# Patient Record
Sex: MALE | Employment: OTHER | URBAN - METROPOLITAN AREA
[De-identification: names, ages, dates, MRNs, and addresses within clinical notes are randomized per-mention and may not be internally consistent; named-entity substitution may affect disease eponyms.]

---

## 2017-04-05 ENCOUNTER — TRANSFERRED RECORDS (OUTPATIENT)
Dept: HEALTH INFORMATION MANAGEMENT | Facility: CLINIC | Age: 27
End: 2017-04-05

## 2017-04-27 ENCOUNTER — HOSPITAL ENCOUNTER (OUTPATIENT)
Dept: BEHAVIORAL HEALTH | Facility: CLINIC | Age: 27
Discharge: HOME | End: 2017-04-27
Attending: SOCIAL WORKER | Admitting: SOCIAL WORKER
Payer: COMMERCIAL

## 2017-04-27 VITALS
HEIGHT: 74 IN | HEART RATE: 84 BPM | WEIGHT: 206.4 LBS | DIASTOLIC BLOOD PRESSURE: 84 MMHG | SYSTOLIC BLOOD PRESSURE: 137 MMHG | BODY MASS INDEX: 26.49 KG/M2

## 2017-04-27 PROCEDURE — H0001 ALCOHOL AND/OR DRUG ASSESS: HCPCS

## 2017-04-27 ASSESSMENT — ANXIETY QUESTIONNAIRES
GAD7 TOTAL SCORE: 0
1. FEELING NERVOUS, ANXIOUS, OR ON EDGE: NOT AT ALL
4. TROUBLE RELAXING: NOT AT ALL
5. BEING SO RESTLESS THAT IT IS HARD TO SIT STILL: NOT AT ALL
7. FEELING AFRAID AS IF SOMETHING AWFUL MIGHT HAPPEN: NOT AT ALL
2. NOT BEING ABLE TO STOP OR CONTROL WORRYING: NOT AT ALL
6. BECOMING EASILY ANNOYED OR IRRITABLE: NOT AT ALL
3. WORRYING TOO MUCH ABOUT DIFFERENT THINGS: NOT AT ALL

## 2017-04-27 NOTE — PROGRESS NOTES
Rule 25 Assessment  Background Information   1. Date of Assessment Request  2. Date of Assessment  4/27/17 3. Date Service Authorized     4.   Jason Callanan, [facility] Intern   5.  Phone Number   [phone] 6. Referent  Self 7. Assessment Site  [facility]     8. Client Name   Kris Amaya 9. Date of Birth  1990 Age  26 year old 10. Gender  male  11. PMI/ Insurance No.  [id number]   12. Client's Primary Language:  English 13. Do you require special accommodations, such as an  or assistance with written material? No   14. Current Address: [address] [place] [id number]   15. Client Phone Numbers: [phone] (home)      16. Tell me what has happened to bring you here today.    The patient came in for a CD assessment at [address] [place] [address]. The patient stated that his reason for being here was for opiate addiction. He was receiving CD treatment at [facility] and was discharged 4/26/17 from [facility] for fraternization with female patients. He was there for 30 days for inpatient/residential and 25 days in residential day treatment. He is looking for a lesser level of care and is going with his continuum of care recommendations for extended care from his previous treatment team.       17. Have you had other rule 25 assessments?     No    DIMENSION I - Acute Intoxication /Withdrawal Potential   1. Chemical use most recent 12 months outside a facility and other significant use history (client self-report)              X = Primary Drug Used   Age of First Use Most Recent Pattern of Use and Duration   Need enough information to show pattern (both frequency and amounts) and to show tolerance for each chemical that has a diagnosis   Date of last use and time, if needed   Withdrawal Potential? Requiring special care Method of use  (oral, smoked, snort, IV, etc)      Alcohol     17 At 17: First use, got alcohol poisoning   On weekends in teens: weekend binge  "drinking, 10-12 beers regularly,   At 18: went to [employer] for vacation and binged. Reported drinking between 6 to 12 beers per night during this time.  In College: Would drink 3 to 4 days a week. Had underage drinking tickets. Could drink a liter of fireball whiskey on weekends.  1 Liter of alcohol per drinking episode   Recent: drinking less due to opiate addiction. 2 or 3 times a month. 2-10 beers during drinking episodes.    Longest period of sobriety: 5 months since December, 2016. December 2016 no oral      Marijuana/  Hashish   17 Was selling THC in high school and using THC everyday. Tolerance increase. From 2-4 bowls to 1/16th oz to 1/8th oz. At 24, was smoking 1/4th oz daily   Heaviest Use: 19 to 23.    Use in Past Year: smoking 2 grams daily    Longest Period of Sobriety: 56 days on 4/27/17   3/5/17 no Smoke  oral      Cocaine/Crack     22 Would use when \"someone had it.\"  Recreational use and came with partying and drinking. Would use a gram per day during partying.    Later on around age 26, was mixing with heroin (speedballing) and recently using once or twice a week prior to entering treatment at [facility]. Recent frequency of use, using a gram per week or per every other week.  3/4/17 no Snorted  IV      Meth/  Amphetamines   22 \"Goofball\" - meth and heroin mixture was done once (February)    (22 to 24) Adderall, used it to study and to go out partying. Not prescribed. Between 25 to 40 mg per day when using.   Most recent use was between 25 to 40 mg every couple of days until February, 2017.  Longest period of sobriety: 2 1/2 months since February, 2017. February, 2017 no Snorted  Oral  IV     X Heroin     22 First use: snorted once time when 22 in college.  Second time: 26, went home to [place] for thanksgiving. Took home oxy's with him to take while home. Ended up taking to oxy's but was not getting the same high. Called up friend who had heroin and used IV.     Heaviest Use: 1/2 to 1 gram in a day " avg.    Use in Past Year: see above    Longest Period of Sobriety: None    3/4/17 no IV  snort     X Other Opiates/  Synthetics   20 At age [age], started with using 30 mg per day which escalated quickly to 120 to 150 mg per day (20 to 22). He reports having binge episodes, 270 to 300 mgs on occasion at least once per week or per every other week.  22 to 24, was on suboxone. At 24, relapsed. Using 30 mgs every other day transitioned to every day. Topped out at 150 mgs. Continues to have binge episodes up to 300 mgs  Heaviest Use:  In college, ages 20 to 22 where daily use of oxycontin was between 120 mg to 150 mg per day.    Use in Past Year: Use had increased after he had relapsed at a party at age 24. Use in last year was between 120 to 150 mg.     Longest Period of Sobriety: 2 years from 22 to 24 while on suboxone.     3/5/17 no Oral  Snort  IV      Inhalants     N/A           Benzodiazepines     21 From 21 to 22, would use (1 to 2 mgs) Xanax to potentiate opiate high. Prolazepram use. 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     Summer 2016 no Oral  snort      Hallucinogens     18  Acid and mushrooms. 21 to 22 would drop acid once a month.  MDMA, used 5 times total June 2016 no Oral       Barbiturates/  Sedatives/  Hypnotics N/A           Over-the-Counter Drugs   N/A           Other     N/A           Nicotine     17 Smoked cigarettes recreationally in high school and would use chewing tobacco.  Heaviest Use: would use a tin chewing tobacco once a day at age 24.       Use in Past Year: Recently, a tin would last about a week.    Longest Period of Sobriety: None   4/26/17  Smoke  oral     2. Do you use greater amounts of alcohol/other drugs to feel intoxicated or achieve the desired effect?  Yes.  Or use the same amount and get less of an effect?  Yes.  Example: Increased tolerance of opiates and THC has caused this patient to need to use more than he intended to become  intoxicated.    3A. Have you ever been to detox?     No    3B. When was the first time?     NA    3C. How many times since then?     NA    3D. Date of most recent detox:     NA    4.  Withdrawal symptoms: Have you had any of the following withdrawal symptoms?  Past 12 months Recent (past 30 days)   Sweating (Rapid Pulse)  Unable to Sleep  Agitation  Fatigue / Extremely Tired  Sad / Depressed Feeling  Muscle Aches  Vivid / Unpleasant Dreams  Irritability  High Blood Pressure  Nausea / Vomiting  Diarrhea  Diminished Appetite  Anxiety / Worried None     's Visual Observations and Symptoms: No visible withdrawal symptoms at this time    Based on the above information, is withdrawal likely to require attention as part of treatment participation?  No    Dimension I Ratings   Acute intoxication/Withdrawal potential - The placing authority must use the criteria in Dimension I to determine a client s acute intoxication and withdrawal potential.    RISK DESCRIPTIONS - Severity ratin Client displays full functioning with good ability to tolerate and cope with withdrawal discomfort. No signs or symptoms of intoxication or withdrawal or resolving signs or symptoms.    REASONS SEVERITY WAS ASSIGNED (What about the amount of the person s use and date of most recent use and history of withdrawal problems suggests the potential of withdrawal symptoms requiring professional assistance? )     This patient reports that he has been sober since 3/5/17 when he was admitted to [facility] for 30 days of treatment. This patient reported that he transitioned from Inpatient to day treatment extended care which he remained sober for 25 days until he was discharged. Patient's GONZALO was 0.000 and UA was negative for all tested substances. He reports sobriety at the time of his evaluation. At the time of the assessment, he endorsed no withdrawal symptoms.         DIMENSION II - Biomedical Complications and Conditions   1. Do you  have any current health/medical conditions?(Include any infectious diseases, allergies, or chronic or acute pain, history of chronic conditions)       No    2. Do you have a health care provider? When was your most recent appointment? What concerns were identified?     Last saw PCP at age 23. Did know about drug use and did suggest treatment at the time.    3. If indicated by answers to items 1 or 2: How do you deal with these concerns? Is that working for you? If you are not receiving care for this problem, why not?      NA    4A. List current medication(s) including over-the-counter or herbal supplements--including pain management:     NA    4B. Do you follow current medical recommendations/take medications as prescribed?     NA    4C. When did you last take your medication?     NA    5. Has a health care provider/healer ever recommended that you reduce or quit alcohol/drug use?     Yes, his PCP recommended CD treatment 3 years ago and that was the last time he saw his PCP.     6. Are you pregnant?     No    7. Have you had any injuries, assaults/violence towards you, accidents, health related issues, overdose(s) or hospitalizations related to your use of alcohol or other drugs:     Yes, explain: had blackouts. One overdose several years ago,had narcan used on him.    8. Do you have any specific physical needs/accommodations? No    Dimension II Ratings   Biomedical Conditions and Complications - The placing authority must use the criteria in Dimension II to determine a client s biomedical conditions and complications.   RISK DESCRIPTIONS - Severity ratin Client displays full functioning with good ability to cope with physical discomfort.    REASONS SEVERITY WAS ASSIGNED (What physical/medical problems does this person have that would inhibit his or her ability to participate in treatment? What issues does he or she have that require assistance to address?)    This patient reports no current medical conditions  or complications that would inhibit him from participating in treatment. This patient reports not currently prescribed or taking any over-the-counter medications.         DIMENSION III - Emotional, Behavioral, Cognitive Conditions and Complications   1. (Optional) Tell me what it was like growing up in your family. (substance use, mental health, discipline, abuse, support)     Family Environment: Patient was raised by both parents. He has 2 siblings. He stated that family environment was positive. Patient stated that life growing up was really good, parents both worked and lived a comfortable life. Was hardworking, paid for everything myself. Was working at age 13. Life started to turn in high school when he started getting into trouble. Felt that mom and dad wouldn't trust him in high school which caused a lot of problems  Substance Use: Patient reported substance use in family members. He stated that materal Grandmother and Grandfather and paternal grandfather had have had problems with abusing substances. He reported maternal grandmother and granfather are currently sober. He reports that no other family members have had CD treatment.  Mental Health: Patient denied a formal mental health diagnosis.  Abuse: Patient denied history of physical, emotional, and/or sexual abuse.  Support: Patient reported feeling supported most of the time. He stated that he was supported by his family.      2. When was the last time that you had significant problems...  A. with feeling very trapped, lonely, sad, blue, depressed or hopeless  about the future? 2 - 12 months ago, he reported that this was related to when he was heavily using in his addiction and realized he was getting out of control    B. with sleep trouble, such as bad dreams, sleeping restlessly, or falling  asleep during the day? 2 - 12 months ago    C. with feeling very anxious, nervous, tense, scared, panicked, or like  something bad was going to happen? 2 - 12  months ago    D. with becoming very distressed and upset when something reminded  you of the past? 2 - 12 months ago    E. with thinking about ending your life or committing suicide? 2 - 12 months ago, was having SI when using and withdrawing recently on a vacation.    3. When was the last time that you did the following things two or more times?  A. Lied or conned to get things you wanted or to avoid having to do  something? 2 - 12 months ago    B. Had a hard time paying attention at school, work, or home? 2 - 12 months ago     C. Had a hard time listening to instructions at school, work, or home? 2 - 12 months ago    D. Were a bully or threatened other people? 2 - 12 months ago    E. Started physical fights with other people? 2 - 12 months ago    Note: These questions are from the Global Appraisal of Individual Needs--Short Screener. Any item marked  past month  or  2 to 12 months ago  will be scored with a severity rating of at least 2.     For each item that has occurred in the past month or past year ask follow up questions to determine how often the person has felt this way or has the behavior occurred? How recently? How has it affected their daily living? And, whether they were using or in withdrawal at the time?    See above    4A. If the person has answered item 2E with  in the past year  or  the past month , ask about frequency and history of suicide in the family or someone close and whether they were under the influence.     He reported that he had thoughts of suicide 2 to 4 months ago when he was heavily using and realizing he had lost control of his addiction. He reports since being sober that he has not had any SI and reports his mood as better.    Any history of suicide in your family? Or someone close to you?     No    4B. If the person answered item 2E  in the past month  ask about  intent, plan, means and access and any other follow-up information  to determine imminent risk. Document any actions  taken to intervene  on any identified imminent risk.      N/A    5A. Have you ever been diagnosed with a mental health problem?     No    5B. Are you receiving care for any mental health issues? If yes, what is the focus of that care or treatment?  Are you satisfied with the service? Most recent appointment?  How has it been helpful?     NA     6. Have you been prescribed medications for emotional/psychological problems?     NA    7. Does your MH provider know about your use?     NA    8A. Have you ever been verbally, emotionally, physically or sexually abused?      No     Follow up questions to learn current risk, continuing emotional impact.      NA    8B. Have you received counseling for abuse?      N/A    9. Have you ever experienced or been part of a group that experienced community violence, historical trauma, rape or assault?     No    10A. [place]:    No    11. Do you have problems with any of the following things in your daily life?    No    Note: If the person has any of the above problems, follow up with items 12, 13, and 14. If none of the issues in item 11 are a problem for the person, skip to item 15.      12. Have you been diagnosed with traumatic brain injury or Alzheimer s?  No    13. If the answer to #12 is no, ask the following questions:    Have you ever hit your head or been hit on the head? No    Were you ever seen in the Emergency Room, hospital or by a doctor because of an injury to your head? No    Have you had any significant illness that affected your brain (brain tumor, meningitis, West Nile Virus, stroke or seizure, heart attack, near drowning or near suffocation)? No    14. If the answer to #12 is yes, ask if any of the problems identified in #11 occurred since the head injury or loss of oxygen. NA    15A. Highest grade of school completed:     Some college, but no degree    15B. Do you have a learning disability? No    15C. Did you ever have tutoring in Math or English? No    15D. Have  you ever been diagnosed with Fetal Alcohol Effects or Fetal Alcohol Syndrome? No    16. If yes to item 15 B, C, or D: How has this affected your use or been affected by your use?     NA    Dimension III Ratings   Emotional/Behavioral/Cognitive - The placing authority must use the criteria in Dimension III to determine a client s emotional, behavioral, and cognitive conditions and complications.   RISK DESCRIPTIONS - Severity ratin Client has difficulty with impulse control and lacks coping skills. Client has thoughts of suicide or harm to others without means; however, the thoughts may interfere with participation in some treatment activities. Client has difficulty functioning in significant life areas. Client has moderate symptoms of emotional, behavioral, or cognitive problems. Client is able to participate in most treatment activities.    REASONS SEVERITY WAS ASSIGNED - What current issues might with thinking, feelings or behavior pose barriers to participation in a treatment program? What coping skills or other assets does the person have to offset those issues? Are these problems that can be initially accommodated by a treatment provider? If not, what specialized skills or attributes must a provider have?    This patient denies a formal mental health diagnosis and reports no current SI, SA, or HI. This patient completed the PHQ-9 (score was 0) and the ABI 7(score was 0). He reported that he had thoughts of suicide 2 to 4 months ago when he was heavily using and realizing he had lost control of his addiction. He reports since being sober that he has not had any SI and reports his mood as better. He reports lacking impulse controls and has limited insight into how his addiction has affected his mental health outside of feeling mentally and emotionally healthier in sobriety.  He reports lacking impulse control skills as his using history suggests an all day, consistent pattern of use despite patient's attempts  "to cut back or curb use. Patient reported that was unable to feel happy or enjoyment when he wasn't using.         DIMENSION IV - Readiness for Change   1. You ve told me what brought you here today. (first section) What do you think the problem really is?     \"I need help transitioning into the real world. When I got kicked out at [facility], I was thrown off. Looking for some further help with transitioning and having accountability.\" He was kicked out due to fraternization with female patients at [facility] outpatient w/housing program and is following recommendations of his treatment team for continuum of care. He reports that he felt that the program was not good for him in that he was unable to stop fraternizing with female patients and it is what lead him to being discharge. He reports that besides that, he had a good experience and was able to gain a lot of insight into his addiction and has a solid foundation to work with to help him in sobriety.    2. Tell me how things are going. Ask enough questions to determine whether the person has use related problems or assets that can be built upon in the following areas: Family/friends/relationships; Legal; Financial; Emotional; Educational; Recreational/ leisure; Vocational/employment; Living arrangements (DSM)      This patient reports that his family is very supportive of him being in recovery and want to see him succeed and live a sober life. They were proud of him for making the initial push of wanting to attend residential treatment and received support from his family members financially to get him into treatment. He reports that his roommates are also supportive of his recovery and he has provided them with resources and Narcan in the case that he relapses.He reports that he currently living with 2 roommates in [place] but is looking for sober housing or an apartment of his own. He reports that only one roommate goes out and drinks at bars and the " "other roommate does not use non-prescribed mood altering chemicals. He reports that he currently has no legal problems.  He reports his current financial situation as \"going okay\" his family is helping financially support him at this time and that he is currently looking for employment. He reports that he is almost done with his college degree at [employer]. He reports that since being sober he has had been participating in more recreational activities with his roommates. He stated during his heaviest using period that he would consistently isolate himself from others and and would not answer calls from family members or friends.    3. What activities have you engaged in when using alcohol/other drugs that could be hazardous to you or others (i.e. 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    4. How much time do you spend getting, using or getting over using alcohol or drugs? (DSM)     Would spend all day getting and using drugs.     5. Reasons for drinking/drug use (Use the space below to record answers. It may not be necessary to ask each item.)  Like the feeling Yes   Trying to forget problems Yes   To cope with stress Yes   To relieve physical pain No   To cope with anxiety Yes   To cope with depression Yes   To relax or unwind Yes   Makes it easier to talk with people Yes   Partner encourages use Yes   Most friends drink or use No   To cope with family problems No   Afraid of withdrawal symptoms/to feel better Yes   Other (specify)  N/A     A. What concerns other people about your alcohol or drug use/Has anyone told you that you use too much? What did they say? (DSM)     \"They knew I had been in treatment before and I was getting out of control and was stealing from others. They were worried about my safety and health. Worried if I were to end up in " "assisted or dead.\"    B. What did you think about that/ do you think you have a problem with alcohol or drug use?     \"It was hard for me to acknowledge it at the time but I do see how it affected them now.\"    6. What changes are you willing to make? What substance are you willing to stop using? How are you going to do that? Have you tried that before? What interfered with your success with that goal?      Willing to do anything. Willing to stop doing all drugs. Want to be not taking any anticraving drugs like naltrexone.     7. What would be helpful to you in making this change?     Looking to find a home group. Looking for a sponsor. Attending OP treatment.    Dimension IV Ratings   Readiness for Change - The placing authority must use the criteria in Dimension IV to determine a client s readiness for change.   RISK DESCRIPTIONS - Severity ratin Client is motivated with active reinforcement, to explore treatment and strategies for change, but ambivalent about illness or need for change.    REASONS SEVERITY WAS ASSIGNED - (What information did the person provide that supports your assessment of his or her readiness to change? How aware is the person of problems caused by continued use? How willing is she or he to make changes? What does the person feel would be helpful? What has the person been able to do without help?)      This patient reports that he is motivated towards recovery and is actively looking for treatment in the community. Although, this patient was recently discharged from his previous treatment due to fraternization with female patients. This suggests a lack of consistent behaviors towards recovery. He was discharged on 17 and came in for Rule 25 CD assessment on 17 which suggests motivation to continue working on sobriety. This patient reports ambivalence towards changing his behaviors regarding boundaries with other individuals. He reported that he is willing to look for a home " "group, obtain a sponsor, and attend [facility] treatment.         DIMENSION V - Relapse, Continued Use, and Continued Problem Potential   1. In what ways have you tried to control, cut-down or quit your use? If you have had periods of sobriety, how did you accomplish that? What was helpful? What happened to prevent you from continuing your sobriety? (DSM)     Yes, tried to cold turkey, willpower, using suboxone. \"Trying to quit by myself never worked out because I would be in withdrawal and get cravings and would feel miserable all of the time.\"     2. Have you experienced cravings? If yes, ask follow up questions to determine if the person recognizes triggers and if the person has had any success in dealing with them.     Yes, in the past year, certain music were triggers. \"I need help figure out what my triggers are\"     3. Have you been treated for alcohol/other drug abuse/dependence?     Yes.  3B. Number of times(lifetime) (over what period) 2.  3C. Number of times completed treatment (lifetime) 1.  3D. During the past three years have you participated in outpatient and/or residential?  Yes.  3E. When and where? In March, 2017 at [facility].   3F. What was helpful? What was not? Was able to get support and learn coping skills and gained insight into his addiction.    4. Support group participation: Have you/do you attend support group meetings to reduce/stop your alcohol/drug use? How recently? What was your experience? Are you willing to restart? If the person has not participated, is he or she willing?     When in [facility], had gone to 6 meetings per week.     5. What would assist you in staying sober/straight?     \"My support system, having more treatment.\" working with his spirituality.    Dimension V Ratings   Relapse/Continued Use/Continued problem potential - The placing authority must use the criteria in Dimension V to determine a client s relapse, continued use, and continued problem potential.   RISK " "DESCRIPTIONS - Severity ratin (A) Client has minimal recognition and understanding of relapse and recidivism issues and displays moderate vulnerability for further substance use or mental health problems. (B) Client has some coping skills inconsistently applied.    REASONS SEVERITY WAS ASSIGNED - (What information did the person provide that indicates his or her understanding of relapse issues? What about the person s experience indicates how prone he or she is to relapse? What coping skills does the person have that decrease relapse potential?)      This patient has a limited understanding of his relapse process and lacks insight into his relapse triggers, cues, and warning signs. He reports that he recognized certain songs as triggers but has not been able to recognize any other triggers thus far. He reports having developed some coping skills such as attending 12-step support group meetings and is looking to obtain a sponsor and begin working the program as well having family support and being connected with his spirituality.          DIMENSION VI - Recovery Environment   1. Are you employed/attending school? Tell me about that.     Unemployed, enrolled at  of  but not currently taking classes due to addiction and then being in treatment.    2A. Describe a typical day; evening for you. Work, school, social, leisure, volunteer, spiritual practices. Include time spent obtaining, using, recovering from drugs or alcohol. (DSM)     Using day: \"sleep until 2 pm, call dealers, get drugs, take a shower, get high, play video games or go to class\"    Sober day: \"eat breakfast, workout, lunch, go to class/work, go to meeting, eat dinner.\"    2B. How often do you spend more time than you planned using or use more than you planned? (DSM)     \"I'm only going to use some in the morning but this would never last\" always use more than planned on a everyday basis.    3. How important is using to your social connections? Do " "many of your family or friends use?     Not important    4A. Are you currently in a significant relationship?     Yes.  4B. How long? 3 to 4 weeks    4C. Sexual Orientation:     Heterosexual    5A. Who do you live with?      With roommates    5B. Tell me about their alcohol/drug use and mental health issues.     One is sober, the other goes out to bars but doesn't smoke THC.     5C. Are you concerned for your safety there? Yes    5D. Are you concerned about the safety of anyone else who lives with you? No    6A. Do you have children who live with you?     NA    6B. Do you have children who do not live with you?     NA    7A. Who supports you in making changes in your alcohol or drug use? What are they willing to do to support you? Who is upset or angry about you making changes in your alcohol or drug use? How big a problem is this for you?      \"Family, roommates, girlfriend. Have set boundaries with roommates and provided them with the Narcan crash course so that they know what to do if I were to relapse. Family are able to provide emotional support.\"     7B. This table is provided to record information about the person s relationships and available support It is not necessary to ask each item; only to get a comprehensive picture of their support system.  How often can you count on the following people when you need someone?   Partner / Spouse N/A   Parent(s)/Aunt(s)/Uncle(s)/Grandparents Always supportive   Sibling(s)/Cousin(s) Always supportive   Child(alessia) N/A   Other relative(s) Usually supportive   Friend(s)/neighbor(s) Always supportive   Child(alessia) s father(s)/mother(s) N/A   Support group member(s) Always supportive   Community of talat members N/A   /counselor/therapist/healer N/A   Other (specify) No     8A. What is your current living situation?     Lives with roommates in a house in [place], [place].    8B. What is your long term plan for where you will be living?     \"Looking to " "get my own place but unsure because I dont like living alone. Looking for sober roommates.\"    8C. Tell me about your living environment/neighborhood? Ask enough follow up questions to determine safety, criminal activity, availability of alcohol and drugs, supportive or antagonistic to the person making changes.      \"I feel safe, no criminal activities, one roommate drinks infrequently, has a supportive environment\"    9. Criminal justice history: Gather current/recent history and any significant history related to substance use--Arrests? Convictions? Circumstances? Alcohol or drug involvement? Sentences? Still on probation or parole? Expectations of the court? Current court order? Any sex offenses - lifetime? What level? (DSM)    None    10. What obstacles exist to participating in treatment? (Time off work, childcare, funding, transportation, pending snf time, living situation)     None    Dimension VI Ratings   Recovery environment - The placing authority must use the criteria in Dimension VI to determine a client s recovery environment.   RISK DESCRIPTIONS - Severity ratin Client has passive social  or family and significant other are not interested in the client s recovery. The client is engaged in structured meaningful activity.    REASONS SEVERITY WAS ASSIGNED - (What support does the person have for making changes? What structure/stability does the person have in his or her daily life that will increase the likelihood that changes can be sustained? What problems exist in the person s environment that will jeopardize getting/staying clean and sober?)     This patient reports having significant support from family members and friends for his recovery. He reports currently looking for OP CD treatment program to help him with holding him accountable in his early phases of recovery. He is currently unemployed and not enrolled in classes at [facility]. He reports that his living environment is safe " and that his roommates and girlfriend are supportive of his sobriety. He has been consistently attending 12-step support group meetings while in previous treatment is looking to continue attending meetings while living in the community.          Client Choice/Exceptions   Would you like services specific to language, age, gender, culture, Adventist preference, race, ethnicity, sexual orientation or disability?  No    What particular treatment choices and options would you like to have? Looking for Evening Oupatient prmary CD    Do you have a preference for a particular treatment program? [facility]    Criteria for Diagnosis     Criteria for Diagnosis  DSM-5 Criteria for Substance Use Disorder  Instructions: Determine whether the client currently meets the criteria for Substance Use Disorder using the diagnostic criteria in the DSM-V pp.[phone]. 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    If court related records were reviewed, summarize here: NA    Information from collateral contacts supported/largely agreed with information from the client and associated risk ratings.      Rule 25 Assessment Summary and Plan   's Recommendation    1) Enter an  outpatient treatment program for primary CD treatment.  2) Abstain from alcohol and all other non-prescribed mood altering substances.  3) Follow all recommendations of medical and mental health providers.   4) Follow all the recommendations of primary counselors.  5) Attend 12-step or other support group meetings on a weekly basis.  6) Obtain a sponsor and begin working a recovery program.      Collateral Contacts     Name:    Electronic Medical Records   Relationship:    N/A   Phone Number:    N/A Releases:    Yes     Collateral data from Electronic Medical Records confirms patient chemical use history.      Collateral Contacts     Name:    Fernando Amaya   Relationship:    Father   Phone Number:    [phone]   Releases:    Yes     At the completion of this assessment, collateral information was not provided.    ollateral Contacts      A problematic pattern of alcohol/drug use leading to clinically significant impairment or distress, as manifested by at least two of the following, occurring within a 12-month period:    Alcohol/drug is often taken in larger amounts or over a longer period than was intended.  There is a persistent desire or unsuccessful efforts to cut down or control alcohol/drug use  A great deal of time is spent in activities necessary to obtain alcohol, use alcohol, or recover from its effects.  Craving, or a strong desire or urge to use alcohol/drug  Recurrent alcohol/drug use resulting in a failure to fulfill major role obligations at work, school or home.  Continued alcohol use despite having persistent or recurrent social or interpersonal problems caused or exacerbated by the effects of alcohol/drug.  Important social, occupational, or recreational activities are given up or reduced because of alcohol/drug use.  Recurrent alcohol/drug use in situations in which it is physically hazardous.  Tolerance, as defined by either of the following: A need for markedly increased amounts of  alcohol/drug to achieve intoxication or desired effect. and A markedly diminished effect with continued use of the same amount of alcohol/drug.  Withdrawal, as manifested by either of the following: The characteristic withdrawal syndrome for alcohol/drug (refer to Criteria A and B of the criteria set for alcohol/drug withdrawal).      Specify if: In early remission:  After full criteria for alcohol/drug use disorder were previously met, none of the criteria for alcohol/drug use disorder have been met for at least 3 months but for less than 12 months (with the exception that Criterion A4,  Craving or a strong desire or urge to use alcohol/drug  may be met).     In sustained remission:   After full criteria for alcohol use disorder were previously met, non of the criteria for alcohol/drug use disorder have been met at any time during a period of 12 months or longer (with the exception that Criterion A4,  Craving or strong desire or urge to use alcohol/drug  may be met).   Specify if:   This additional specifier is used if the individual is in an environment where access to alcohol is restricted.    Mild: Presence of 2-3 symptoms    Moderate: Presence of 4-5 symptoms    Severe: Presence of 6 or more symptoms

## 2017-04-27 NOTE — PROGRESS NOTES
[address] [address]               ADULT CD ASSESSMENT      Additional Clinical Questions - Outpatient    Patient Name: Kris Amaya  Cell Phone:   Home: [phone] (home)    Mobile:   Telephone Information:   Mobile [phone]       Email:  [email or web address]  Emergency Contact: Fernando Fowler   Tel: [phone]    ________________________________________________________________________      The patient is  Single, in a serious relationship    With which race do you identify? White    Please list your family members and if they are living or , i.e. (grandparents, parents, step-parents, adoptive parents, number of siblings, half-siblings, etc.)     Mother   Living Father Living   No Step-mother   NA No Step-father NA   Maternal Grandmother   Living Fraternal Grandmother Living   Maternal Grandfather    Living Fraternal Grandfather Living   No Sister(s) NA 2 Brother(s)   Living   No Half-sister(s)   NA No Half-brother(s) NA             Who raised you? (parents, grandparents, adoptive parents, step-parents, etc.)    Both Parents    Have any of your family members or significant others had problems with mental illness or substance abuse?  Please explain.    Both maternal grandparents had substance use problems but were able to quit on their own. No reported mental illness conditions with his family.    Do you have any children or Stepchildren? No    Are you being investigated by Child Protection Services? No    Do you have a child protection worker, probation office or ? No    How would you describe your current finances?  Some money problems    If you are having problems, (unpaid bills, bankruptcy, IRS problems) please explain:  Yes, please explain: currently unemployed    If working or a student are you able to function appropriately in that setting? Yes    Describe your preferred learning style:  by hands-on practice    What personal strengths do  you have that can help you get sober?  Honesty, loyalty, commitment, willingness    Do you currently self-administer your medications?  N/A    Have you ever:    Had to lie to people important to you about how much you scott?     No     Felt the need to bet more and more money?      No     Attempted treatment for a gambling problem?        No     Touched or fondled someone else inappropriately, or forced them to have sex with you against their will?       No     Are you or have you ever been a registered sex offender?        No     Is there any history of sexual abuse in your family?        No     [place] obsessed by your sexual behavior (having sex with many partners, masturbating often, using pornography often?        Yes, If yes explain: multiple partners     Received therapy or stayed in the hospital for mental health problems?        No     Hurt yourself (cutting, burning or hitting yourself)?        No     Purged, binged or restricted yourself as a way to control your weight?      No       Are you on a special diet?       No       Do you have any concerns regarding your nutritional status?        No       Have you had any appetite changes in the last 3 months?        Yes, If yes explain: it got better when I stopped using       Have you had any weight loss or weight gain in the last 3 months?  If yes, how much gain or loss:     If weight patient gains more than 10 lbs or loses more than 10 lbs, refer to program RN /  Attending Physician for assessment.    Yes, If yes explain: gained 7 pounds        Was the patient informed of BMI?      Normal, No Intervention   Yes     Do you have any dental problems?        No     Lived through any trauma or stressful events?        No     In the past month, have you had any of the following symptoms related to the trauma listed above? (Dreams, intense memories, flashbacks, physical reactions, etc.)         No     Believed that people are spying on you, or that someone was  plotting against you or trying to hurt you?       No     Believed that someone was reading your mind or could hear your thoughts or that you could actually read someone's mind, hear what another person was thinking?       No     Believed that someone or some force outside of yourself put thoughts in your mind that were not your own, or made you act in a way that was not your usual self?  Or have you ever thought you were possessed?         No     Believed that you were being sent special messages through the TV, radio or newspaper?         No     [facility] things other people couldn't hear, such as voices?         No     Had visions when you were awake?  Or have you ever seen things other people couldn't see?       No         Suicide Screening Questions:    1. Are you feeling hopeless about the present/future?   No   2. Have you ever had thoughts about taking your life?   No   3. When did you have these thoughts? NA   4. Do you have any current intent or active desire to take your life?   No   5. Do you have a plan to take your life?    No   6. Have you ever made a suicide attempt?   No   7. Do you have access to pills, guns or other methods to kill yourself?   Yes, If yes explain: firearms       Risk Status - Use as Guide/Example    Ideation - Active  Thoughts of suicide Intent to follow  Through on suicide Plan for completing  suicide    Yes No Yes No Yes No   Emergent X  X  X    Urgent / Non-Emergent X  X   X   Non-Urgent X   X  X   No Current / Active Risk (Past 6 Months)  X  X  X   Kris Amaya No No No       Additional Risk Factors: Tendency to be socially isolated and/or cut off from the support of others  History of impulsive or aggressive behaviors   Protective Factors:  Having people in his/her life that would prevent the patient from considering committing suicide (i.e. young children, spouse, parents, etc.)  Having easy access to supportive family members  Having a good community support network     Risk  "Status:    Emergent? No  Urgent / Non-Emergent?  No  Present / Non- Urgent? No      No Current Risk? Yes, Evaluation Counselors - Document in Epic / SBAR to counselor \"No identified risk\" and Treatment Counselors - Assess weekly in progress notes under Dimension 3 and summarize in Discharge / Treatment summary under Dimension 3.    Additional information to support suicide risk rating: See Above    Mental Status Assessment    Physical Appearance/Attire:  Appears stated age  Hygiene:  well groomed  Eye Contact:  at examiner  Speech:  regular  Speech Volume:  regular  Speech Quality: fluid  Cognitive/Perceptual:  reality based  Cognition:  memory intact , impaired immediate, impaired recent and impaired remote  Judgment:  intact  Insight:  intact  Orientation:  time, place, person and situation  Thought:  logical   Hallucinations:  none  General Behavioral Tone:  cooperative  Psychomotor Activity:  no problem noted  Gait:  no problem  Mood:  normal and appropriate  Affect:  congruence/appropriate    Counselor Notes: NA    Criteria for Diagnosis  DSM-5 Criteria for Substance Abuse    305.00 (F10.10) Alcohol Use Disorder Mild, in early remission  304.30 (F12.20) Cannabis Use Disorder Severe  304.00 (F11.20) Opioid Use Disorder Severe  304.10 (F13.20) Sedative, Hypnotic, Anxiolytic Use Disorder Severe  304.40 (F15.20) Amphetamine Use Disorder Severe  304.20 (F14.20) Cocaine Use Disorder Moderate  305.10 (F17.20) Tobacco Use Disorder Moderate  LEVEL OF CARE    Intoxication and Withdrawal: 0  Biomedical:  0  Emotional and Behavioral:  2  Readiness to Change:  1  Relapse Potential: 3  Recovery Environmental:  1    Initial problem list:    The patient lacks relapse prevention skills  The patient has poor coping skills  The patient has poor refusal skills   The patient has a tendency to isolate    Patient/Client is willing to follow treatment recommendations.  Yes    Jason Callanan      Vulnerable Adult Checklist for " OUTPATIENTS     1.  Do you have a physical, emotional or mental infirmity or dysfunction?       No    2.  Does this issue impair your ability to provide for your own care without help, including providing yourself with food, shelter, clothing, healthcare or supervision?       No    3.  Because of this issue, I need assistance to protect myself from maltreatment by others.      No    Based on the above information:    This person is not a functional Vulnerable Adult according to [place] Statute 626.5572 subdivision 21.

## 2017-04-28 ASSESSMENT — ANXIETY QUESTIONNAIRES: GAD7 TOTAL SCORE: 0

## 2017-04-28 ASSESSMENT — PATIENT HEALTH QUESTIONNAIRE - PHQ9: SUM OF ALL RESPONSES TO PHQ QUESTIONS 1-9: 0

## 2019-06-26 ENCOUNTER — HOSPITAL ENCOUNTER (INPATIENT)
Facility: CLINIC | Age: 29
LOS: 2 days | Discharge: HOME | DRG: 897 | End: 2019-06-28
Attending: EMERGENCY MEDICINE | Admitting: PSYCHIATRY & NEUROLOGY
Payer: COMMERCIAL

## 2019-06-26 DIAGNOSIS — F11.20 UNCOMPLICATED OPIOID DEPENDENCE (H): ICD-10-CM

## 2019-06-26 PROBLEM — F19.20 CHEMICAL DEPENDENCY (H): Status: ACTIVE | Noted: 2019-06-26

## 2019-06-26 LAB
ALBUMIN SERPL-MCNC: 4 G/DL (ref 3.4–5)
ALP SERPL-CCNC: 64 U/L (ref 40–150)
ALT SERPL W P-5'-P-CCNC: 23 U/L (ref 0–70)
AMPHETAMINES UR QL SCN: NEGATIVE
ANION GAP SERPL CALCULATED.3IONS-SCNC: 6 MMOL/L (ref 3–14)
AST SERPL W P-5'-P-CCNC: 24 U/L (ref 0–45)
BARBITURATES UR QL: NEGATIVE
BASOPHILS # BLD AUTO: 0 10E9/L (ref 0–0.2)
BASOPHILS NFR BLD AUTO: 0.2 %
BENZODIAZ UR QL: NEGATIVE
BILIRUB SERPL-MCNC: 0.4 MG/DL (ref 0.2–1.3)
BUN SERPL-MCNC: 16 MG/DL (ref 7–30)
CALCIUM SERPL-MCNC: 9.1 MG/DL (ref 8.5–10.1)
CANNABINOIDS UR QL SCN: POSITIVE
CHLORIDE SERPL-SCNC: 100 MMOL/L (ref 94–109)
CHOLEST SERPL-MCNC: 140 MG/DL
CO2 SERPL-SCNC: 27 MMOL/L (ref 20–32)
COCAINE UR QL: NEGATIVE
CREAT SERPL-MCNC: 0.75 MG/DL (ref 0.66–1.25)
DIFFERENTIAL METHOD BLD: NORMAL
EOSINOPHIL # BLD AUTO: 0.1 10E9/L (ref 0–0.7)
EOSINOPHIL NFR BLD AUTO: 0.5 %
ERYTHROCYTE [DISTWIDTH] IN BLOOD BY AUTOMATED COUNT: 11.5 % (ref 10–15)
ETHANOL UR QL SCN: NEGATIVE
GFR SERPL CREATININE-BSD FRML MDRD: >90 ML/MIN/{1.73_M2}
GLUCOSE SERPL-MCNC: 65 MG/DL (ref 70–99)
HCT VFR BLD AUTO: 44.4 % (ref 40–53)
HDLC SERPL-MCNC: 56 MG/DL
HGB BLD-MCNC: 15 G/DL (ref 13.3–17.7)
IMM GRANULOCYTES # BLD: 0 10E9/L (ref 0–0.4)
IMM GRANULOCYTES NFR BLD: 0.3 %
LDLC SERPL CALC-MCNC: 70 MG/DL
LYMPHOCYTES # BLD AUTO: 2.4 10E9/L (ref 0.8–5.3)
LYMPHOCYTES NFR BLD AUTO: 26 %
MCH RBC QN AUTO: 29.2 PG (ref 26.5–33)
MCHC RBC AUTO-ENTMCNC: 33.8 G/DL (ref 31.5–36.5)
MCV RBC AUTO: 87 FL (ref 78–100)
MONOCYTES # BLD AUTO: 1.1 10E9/L (ref 0–1.3)
MONOCYTES NFR BLD AUTO: 11.9 %
NEUTROPHILS # BLD AUTO: 5.6 10E9/L (ref 1.6–8.3)
NEUTROPHILS NFR BLD AUTO: 61.1 %
NONHDLC SERPL-MCNC: 84 MG/DL
NRBC # BLD AUTO: 0 10*3/UL
NRBC BLD AUTO-RTO: 0 /100
OPIATES UR QL SCN: POSITIVE
PLATELET # BLD AUTO: 212 10E9/L (ref 150–450)
POTASSIUM SERPL-SCNC: 4.3 MMOL/L (ref 3.4–5.3)
PROT SERPL-MCNC: 7.5 G/DL (ref 6.8–8.8)
RBC # BLD AUTO: 5.13 10E12/L (ref 4.4–5.9)
SODIUM SERPL-SCNC: 133 MMOL/L (ref 133–144)
TRIGL SERPL-MCNC: 68 MG/DL
WBC # BLD AUTO: 9.2 10E9/L (ref 4–11)

## 2019-06-26 PROCEDURE — 80307 DRUG TEST PRSMV CHEM ANLYZR: CPT | Performed by: EMERGENCY MEDICINE

## 2019-06-26 PROCEDURE — 99285 EMERGENCY DEPT VISIT HI MDM: CPT | Mod: 25 | Performed by: EMERGENCY MEDICINE

## 2019-06-26 PROCEDURE — 85025 COMPLETE CBC W/AUTO DIFF WBC: CPT | Performed by: FAMILY MEDICINE

## 2019-06-26 PROCEDURE — 80061 LIPID PANEL: CPT | Performed by: FAMILY MEDICINE

## 2019-06-26 PROCEDURE — 80320 DRUG SCREEN QUANTALCOHOLS: CPT | Performed by: EMERGENCY MEDICINE

## 2019-06-26 PROCEDURE — 80053 COMPREHEN METABOLIC PANEL: CPT | Performed by: FAMILY MEDICINE

## 2019-06-26 PROCEDURE — HZ2ZZZZ DETOXIFICATION SERVICES FOR SUBSTANCE ABUSE TREATMENT: ICD-10-PCS | Performed by: PSYCHIATRY & NEUROLOGY

## 2019-06-26 PROCEDURE — 86803 HEPATITIS C AB TEST: CPT | Performed by: FAMILY MEDICINE

## 2019-06-26 PROCEDURE — 25000132 ZZH RX MED GY IP 250 OP 250 PS 637: Performed by: PSYCHIATRY & NEUROLOGY

## 2019-06-26 PROCEDURE — 12800008 ZZH R&B CD ADULT

## 2019-06-26 PROCEDURE — 25000132 ZZH RX MED GY IP 250 OP 250 PS 637: Performed by: EMERGENCY MEDICINE

## 2019-06-26 PROCEDURE — 99284 EMERGENCY DEPT VISIT MOD MDM: CPT | Mod: Z6 | Performed by: EMERGENCY MEDICINE

## 2019-06-26 RX ORDER — BUPRENORPHINE 2 MG/1
2 TABLET SUBLINGUAL 4 TIMES DAILY PRN
Status: DISCONTINUED | OUTPATIENT
Start: 2019-06-26 | End: 2019-06-27

## 2019-06-26 RX ORDER — BISACODYL 10 MG
10 SUPPOSITORY, RECTAL RECTAL DAILY PRN
Status: DISCONTINUED | OUTPATIENT
Start: 2019-06-26 | End: 2019-06-28 | Stop reason: HOSPADM

## 2019-06-26 RX ORDER — TRAZODONE HYDROCHLORIDE 50 MG/1
50 TABLET, FILM COATED ORAL
Status: DISCONTINUED | OUTPATIENT
Start: 2019-06-26 | End: 2019-06-28 | Stop reason: HOSPADM

## 2019-06-26 RX ORDER — ALUMINA, MAGNESIA, AND SIMETHICONE 2400; 2400; 240 MG/30ML; MG/30ML; MG/30ML
30 SUSPENSION ORAL EVERY 4 HOURS PRN
Status: DISCONTINUED | OUTPATIENT
Start: 2019-06-26 | End: 2019-06-28 | Stop reason: HOSPADM

## 2019-06-26 RX ORDER — HYDROXYZINE HYDROCHLORIDE 25 MG/1
25 TABLET, FILM COATED ORAL EVERY 4 HOURS PRN
Status: DISCONTINUED | OUTPATIENT
Start: 2019-06-26 | End: 2019-06-28 | Stop reason: HOSPADM

## 2019-06-26 RX ORDER — CLONIDINE HYDROCHLORIDE 0.1 MG/1
.1-.2 TABLET ORAL EVERY 6 HOURS PRN
Status: DISCONTINUED | OUTPATIENT
Start: 2019-06-26 | End: 2019-06-28 | Stop reason: HOSPADM

## 2019-06-26 RX ORDER — BUPRENORPHINE AND NALOXONE 8; 2 MG/1; MG/1
1 FILM, SOLUBLE BUCCAL; SUBLINGUAL DAILY
Status: ON HOLD | COMMUNITY
End: 2019-06-27

## 2019-06-26 RX ORDER — ACETAMINOPHEN 325 MG/1
650 TABLET ORAL EVERY 4 HOURS PRN
Status: DISCONTINUED | OUTPATIENT
Start: 2019-06-26 | End: 2019-06-28 | Stop reason: HOSPADM

## 2019-06-26 RX ORDER — LOPERAMIDE HCL 2 MG
2 CAPSULE ORAL 4 TIMES DAILY PRN
Status: DISCONTINUED | OUTPATIENT
Start: 2019-06-26 | End: 2019-06-28 | Stop reason: HOSPADM

## 2019-06-26 RX ORDER — IBUPROFEN 600 MG/1
600 TABLET, FILM COATED ORAL EVERY 6 HOURS PRN
Status: DISCONTINUED | OUTPATIENT
Start: 2019-06-26 | End: 2019-06-28 | Stop reason: HOSPADM

## 2019-06-26 RX ORDER — BUPRENORPHINE 2 MG/1
2 TABLET SUBLINGUAL 4 TIMES DAILY PRN
Status: DISCONTINUED | OUTPATIENT
Start: 2019-06-26 | End: 2019-06-26

## 2019-06-26 RX ADMIN — NICOTINE POLACRILEX 8 MG: 4 GUM, CHEWING ORAL at 21:58

## 2019-06-26 RX ADMIN — TRAZODONE HYDROCHLORIDE 50 MG: 50 TABLET ORAL at 22:20

## 2019-06-26 RX ADMIN — NICOTINE POLACRILEX 2 MG: 2 GUM, CHEWING BUCCAL at 19:05

## 2019-06-26 ASSESSMENT — ENCOUNTER SYMPTOMS
HALLUCINATIONS: 0
DYSURIA: 0
COUGH: 0
NAUSEA: 0
VOMITING: 0
FEVER: 0
ABDOMINAL PAIN: 0
SHORTNESS OF BREATH: 0

## 2019-06-26 ASSESSMENT — ACTIVITIES OF DAILY LIVING (ADL)
LAUNDRY: WITH SUPERVISION
DRESS: STREET CLOTHES;INDEPENDENT
HYGIENE/GROOMING: INDEPENDENT
ORAL_HYGIENE: INDEPENDENT

## 2019-06-26 ASSESSMENT — MIFFLIN-ST. JEOR: SCORE: 1951.48

## 2019-06-26 NOTE — ED NOTES
ED to Behavioral Floor Handoff    SITUATION  Kris Amaya is a 29 year old male who speaks English and lives in a home with a spouse The patient arrived in the ED by private car from home with a complaint of Addiction Problem (seeking detox from IV Heroin, has a bed. Last use was this morning. )  .The patient's current symptoms started/worsened 2 week(s) ago and during this time the symptoms have increased.   In the ED, pt was diagnosed with   Final diagnoses:   None        Initial vitals were: BP: 145/78  Pulse: 87  Temp: 98.4  F (36.9  C)  Resp: 16  Weight: 91.2 kg (201 lb)  SpO2: 97 %   --------  Is the patient diabetic? No   If yes, last blood glucose? --     If yes, was this treated in the ED? --  --------  Is the patient inebriated (ETOH) No or Impaired on other substances? No  MSSA done? Yes  Last MSSA score: --    Were withdrawal symptoms treated? N/A  Does the patient have a seizure history? No. If yes, date of most recent seizure--  --------  Is the patient patient experiencing suicidal ideation? denies current or recent suicidal ideation     Homicidal ideation? denies current or recent homicidal ideation or behaviors.    Self-injurious behavior/urges? denies current or recent self injurious behavior or ideation.  ------  Was pt aggressive in the ED No  Was a code called No  Is the pt now cooperative? Yes  -------  Meds given in ED: Medications - No data to display   Family present during ED course? No  Family currently present? No    BACKGROUND  Does the patient have a cognitive impairment or developmental disability? No  Allergies: No Known Allergies.   Social demographics are   Social History     Socioeconomic History     Marital status: Single     Spouse name: None     Number of children: None     Years of education: None     Highest education level: None   Occupational History     None   Social Needs     Financial resource strain: None     Food insecurity:     Worry: None     Inability: None      Transportation needs:     Medical: None     Non-medical: None   Tobacco Use     Smoking status: Never Smoker     Smokeless tobacco: Current User   Substance and Sexual Activity     Alcohol use: Not Currently     Drug use: Yes     Types: IV, Opiates, Marijuana     Sexual activity: Yes     Partners: Female     Birth control/protection: Other   Lifestyle     Physical activity:     Days per week: None     Minutes per session: None     Stress: None   Relationships     Social connections:     Talks on phone: None     Gets together: None     Attends Scientologist service: None     Active member of club or organization: None     Attends meetings of clubs or organizations: None     Relationship status: None     Intimate partner violence:     Fear of current or ex partner: None     Emotionally abused: None     Physically abused: None     Forced sexual activity: None   Other Topics Concern     Parent/sibling w/ CABG, MI or angioplasty before 65F 55M? Not Asked   Social History Narrative     None        ASSESSMENT  Labs results Labs Ordered and Resulted from Time of ED Arrival Up to the Time of Departure from the ED - No data to display   Imaging Studies: No results found for this or any previous visit (from the past 24 hour(s)).   Most recent vital signs /78   Pulse 87   Temp 98.4  F (36.9  C) (Oral)   Resp 16   Wt 91.2 kg (201 lb)   SpO2 97%   BMI 25.81 kg/m     Abnormal labs/tests/findings requiring intervention:---   Pain control: pt had none  Nausea control: pt had none    RECOMMENDATION  Are any infection precautions needed (MRSA, VRE, etc.)? No If yes, what infection? --  ---  Does the patient have mobility issues? independently. If yes, what device does the pt use? ---  ---  Is patient on 72 hour hold or commitment? No If on 72 hour hold, have hold and rights been given to patient? N/A  Are admitting orders written if after 10 p.m. ?N/A  Tasks needing to be completed:---     Oscar Lawson   [facility]--    [phone]  [place] ED   [phone] [facility] ED

## 2019-06-26 NOTE — ED PROVIDER NOTES
[facility] EMERGENCY DEPARTMENT ([facility])     June 26, 2019    History     Chief Complaint   Patient presents with     Addiction Problem     seeking detox from IV Heroin, has a bed. Last use was this morning.      HPI  Kris Amaya is an otherwise healthy 29 year old male who presents to the ED with his girlfriend seeking detox for IV heroine use. Patient reports detox is holding a bed for him. He denies infection or redness, but states he has track marks. He states he has been using for a year and a half. He was in treatment 3 or 4 months ago, then relapsed a couple months ago. Patient has been using about half a gram of heroine a day for the past month, with his last use being this morning. He has a history of withdrawal symptoms which include malaise, subjective fever, chills, restlessness, inability to sleep, and muscle aches. Patient reports he has not had withdrawal symptoms yet. He reports he uses marijuana, but denies alcohol or other drug use. Patient denies fever, nausea, vomiting, abdominal pain, shortness of breath, cough, or dysuria. He also denies suicidal ideation, homicidal ideation, or hallucinations. Of note, patient states he is currently in an intensive outpatient program and they recommended a higher level of care, but he will return to that program after detox.     PAST MEDICAL HISTORY  Past Medical History:   Diagnosis Date     NO ACTIVE PROBLEMS (aka NONE)      PAST SURGICAL HISTORY  Past Surgical History:   Procedure Laterality Date     NO HISTORY OF SURGERY       FAMILY HISTORY  Family History   Problem Relation Age of Onset     Alcohol/Drug Other      Substance Abuse Other      Substance Abuse Maternal Grandmother      Substance Abuse Maternal Grandfather      Substance Abuse Paternal Grandfather      Mental Illness Brother      SOCIAL HISTORY  Social History     Tobacco Use     Smoking status: Never Smoker     Smokeless tobacco: Current User   Substance Use Topics     Alcohol  use: Not Currently     MEDICATIONS  No current facility-administered medications for this encounter.      Current Outpatient Medications   Medication     buprenorphine HCl-naloxone HCl (SUBOXONE) 8-2 MG per film     ALLERGIES  No Known Allergies      I have reviewed the Medications, Allergies, Past Medical and Surgical History, and Social History in the Epic system.    Review of Systems   Constitutional: Negative for fever.   Respiratory: Negative for cough and shortness of breath.    Gastrointestinal: Negative for abdominal pain, nausea and vomiting.   Genitourinary: Negative for dysuria.   Neurological:        Positive for chemical intoxication.   Psychiatric/Behavioral: Negative for hallucinations and suicidal ideas.   All other systems reviewed and are negative.      Physical Exam   BP: 145/78  Pulse: 87  Temp: 98.4  F (36.9  C)  Resp: 16  Weight: 91.2 kg (201 lb)  SpO2: 97 %      Physical Exam     GEN:  Well developed, no acute distress  HEENT:  EOMI, Mucous membranes are moist.   Cardio:  RRR, no murmur, radial pulses equal bilaterally  PULM:  Lungs clear, good air movement, no wheezes, rales   Abd:  Soft, normal bowel sounds, no focal tenderness  Musculoskeletal:  normal range of motion, no lower extremity swelling or calf tenderness  Neuro:  Alert and oriented X3, Follows commands, moving all extremities spontaneously   Skin:  Warm, dry  Psych: Normal mood and affect, denies suicidal ideation, homicidal ideation, hallucinations      ED Course        Procedures       4:46 PM  The patient was seen and examined by Dr. Diallo in Room 16C.          Critical Care time:  none  We will obtain urine tox screen in the ED.  Patient has been cooperative in the ED.    Labs Ordered and Resulted from Time of ED Arrival Up to the Time of Departure from the ED - No data to display         Assessments & Plan (with Medical Decision Making)   Patient reports seeking admission to detox for IV heroin abuse and dependence.  There  are no acute mental health issues or medical emergencies that need attention.  He will be admitted to detox once the unit is ready for him.    I have reviewed the nursing notes.    I have reviewed the findings, diagnosis, plan and need for follow up with the patient.       Medication List      There are no discharge medications for this visit.         Final diagnoses:   Uncomplicated opioid dependence (H)     IEmmanuelle, am serving as a trained medical scribe to document services personally performed by Lanette Diallo MD, based on the provider's statements to me.      Lanette BUCK MD, was physically present and have reviewed and verified the accuracy of this note documented by Emmanuelle Carcamo.     6/26/2019   [facility], [place], EMERGENCY DEPARTMENT     Lanette Diallo MD  06/26/19 [id number]

## 2019-06-26 NOTE — PHARMACY-ADMISSION MEDICATION HISTORY
"Admission Medication History status for the 6/26/2019 admission is complete.  See EPIC admission navigator for Prior to Admission medications.    Medication history sources:  Patient     Medication history source reliability: Good    Medication adherence:  Poor    Changes made to PTA medication list (reason)  Added: n/a  Deleted: n/a  Changed: n/a    Additional medication history information (including reliability of information, actions taken by pharmacist):   -Patient was a good historian; he reports taking Suboxone \"sporadically\" for the past month since he has been using heroin again. States that he uses Suboxone on the days that he does not use heroin and last took 8mg on Saturday (6/22/19)    Time spent in this activity: 10 minutes    Medication history completed by: DOMINIC Cordoba4 Pharmacy Intern     Prior to Admission medications    Medication Sig Last Dose Taking? Auth Provider   buprenorphine HCl-naloxone HCl (SUBOXONE) 8-2 MG per film Place 1 Film under the tongue daily 6/22/2019 at Unknown time Yes Reported, Patient       "

## 2019-06-27 LAB
HCV AB SERPL QL IA: NONREACTIVE
HIV 1+2 AB+HIV1 P24 AG SERPL QL IA: NONREACTIVE

## 2019-06-27 PROCEDURE — 99207 ZZC NON-BILLABLE SERV PER CHARTING: CPT | Performed by: PHYSICIAN ASSISTANT

## 2019-06-27 PROCEDURE — 25000132 ZZH RX MED GY IP 250 OP 250 PS 637: Performed by: PSYCHIATRY & NEUROLOGY

## 2019-06-27 PROCEDURE — 99222 1ST HOSP IP/OBS MODERATE 55: CPT | Mod: AI | Performed by: PSYCHIATRY & NEUROLOGY

## 2019-06-27 PROCEDURE — 99207 ZZC CDG-MDM COMPONENT: MEETS MODERATE - DOWN CODED: CPT | Performed by: PSYCHIATRY & NEUROLOGY

## 2019-06-27 PROCEDURE — 87389 HIV-1 AG W/HIV-1&-2 AB AG IA: CPT | Performed by: PSYCHIATRY & NEUROLOGY

## 2019-06-27 PROCEDURE — 36415 COLL VENOUS BLD VENIPUNCTURE: CPT | Performed by: PSYCHIATRY & NEUROLOGY

## 2019-06-27 PROCEDURE — 12800008 ZZH R&B CD ADULT

## 2019-06-27 RX ORDER — NALOXONE HYDROCHLORIDE 0.4 MG/ML
.1-.4 INJECTION, SOLUTION INTRAMUSCULAR; INTRAVENOUS; SUBCUTANEOUS
Status: DISCONTINUED | OUTPATIENT
Start: 2019-06-27 | End: 2019-06-28 | Stop reason: HOSPADM

## 2019-06-27 RX ORDER — BUPRENORPHINE 2 MG/1
2 TABLET SUBLINGUAL 4 TIMES DAILY
Status: DISCONTINUED | OUTPATIENT
Start: 2019-06-27 | End: 2019-06-28

## 2019-06-27 RX ADMIN — BUPRENORPHINE HYDROCHLORIDE 2 MG: 2 TABLET SUBLINGUAL at 19:08

## 2019-06-27 RX ADMIN — NICOTINE POLACRILEX 8 MG: 4 GUM, CHEWING ORAL at 19:08

## 2019-06-27 RX ADMIN — NICOTINE POLACRILEX 8 MG: 4 GUM, CHEWING ORAL at 21:15

## 2019-06-27 RX ADMIN — BUPRENORPHINE HCL 2 MG: 2 TABLET SUBLINGUAL at 12:44

## 2019-06-27 RX ADMIN — NICOTINE POLACRILEX 8 MG: 4 GUM, CHEWING ORAL at 13:03

## 2019-06-27 RX ADMIN — NICOTINE POLACRILEX 8 MG: 4 GUM, CHEWING ORAL at 15:51

## 2019-06-27 RX ADMIN — TRAZODONE HYDROCHLORIDE 50 MG: 50 TABLET ORAL at 21:37

## 2019-06-27 RX ADMIN — HYDROXYZINE HYDROCHLORIDE 25 MG: 25 TABLET ORAL at 15:51

## 2019-06-27 RX ADMIN — BUPRENORPHINE HYDROCHLORIDE 2 MG: 2 TABLET SUBLINGUAL at 21:37

## 2019-06-27 RX ADMIN — HYDROXYZINE HYDROCHLORIDE 25 MG: 25 TABLET ORAL at 21:37

## 2019-06-27 RX ADMIN — BUPRENORPHINE HCL 2 MG: 2 TABLET SUBLINGUAL at 17:08

## 2019-06-27 RX ADMIN — NICOTINE POLACRILEX 8 MG: 4 GUM, CHEWING ORAL at 20:15

## 2019-06-27 RX ADMIN — NICOTINE POLACRILEX 8 MG: 4 GUM, CHEWING ORAL at 17:08

## 2019-06-27 NOTE — CONSULTS
"  [facility]  Internal Medicine Consult     Kris Amaya MRN# [id number]   Age: 29 year old YOB: 1990     Date of Admission: 6/26/2019  Date of Consult:  6/27/2019    Primary Care Provider: No Ref-Primary, Physician    Requesting Service: Psychiatry  Reason for Consult: General Medical Evaluation         Assessment and Recommendations:   Kris Amaya is a 29 year old male who was admitted to [facility] station 3A seeking detox from heroin.     # Opiate abuse, acute withdrawal. Management per primary team, psychiatry. Lab workup unremarkable. VSS.  - Agree with subutex      Internal Medicine will sign off at this time. Please notify if any intercurrent medical questions or concerns arise. Thank you for involving me in this patients care.         Hoa Polanco PA-C  Hospitalist Service  [phone]           Chief Complaint:   Opiate withdrawal         History of Present Illness:   Kris Amaya is a 29 year old male who was admitted to [facility] station 3A seeking detox from heroin.   Per ED note:  \" Patient reports detox is holding a bed for him. He denies infection or redness, but states he has track marks. He states he has been using for a year and a half. He was in treatment 3 or 4 months ago, then relapsed a couple months ago. Patient has been using about half a gram of heroine a day for the past month, with his last use being this morning. He has a history of withdrawal symptoms which include malaise, subjective fever, chills, restlessness, inability to sleep, and muscle aches. Patient reports he has not had withdrawal symptoms yet. He reports he uses marijuana, but denies alcohol or other drug use. Patient denies fever, nausea, vomiting, abdominal pain, shortness of breath, cough, or dysuria. He also denies suicidal ideation, homicidal ideation, or hallucinations. Of note, patient states he is currently in an intensive outpatient program and they recommended a higher level of " "care, but he will return to that program after detox. \"  Currently denies any symptoms of withdrawal. States that last use was yesterday morning. Anticipates that he will start to feel withdrawal soon. Denies any chest pain, shortness of breath or difficulty breathing. No chronic medical conditions.          Review of Systems:   A 10 point review of systems was performed and is negative unless otherwise noted in HPI.          Past Medical History:     Past Medical History:   Diagnosis Date     NO ACTIVE PROBLEMS (aka NONE)             Past Surgical History:      Past Surgical History:   Procedure Laterality Date     NO HISTORY OF SURGERY               Family History:     Family History   Problem Relation Age of Onset     Alcohol/Drug Other      Substance Abuse Other      Substance Abuse Maternal Grandmother      Substance Abuse Maternal Grandfather      Substance Abuse Paternal Grandfather      Mental Illness Brother              Social History:     Social History     Tobacco Use     Smoking status: Never Smoker     Smokeless tobacco: Current User   Substance Use Topics     Alcohol use: Not Currently             Medications:     Current Facility-Administered Medications   Medication     acetaminophen (TYLENOL) tablet 650 mg     alum & mag hydroxide-simethicone (MYLANTA ES/MAALOX  ES) suspension 30 mL     bisacodyl (DULCOLAX) Suppository 10 mg     buprenorphine (SUBUTEX) sublingual tablet 2 mg     cloNIDine (CATAPRES) tablet 0.1-0.2 mg     hydrOXYzine (ATARAX) tablet 25 mg     ibuprofen (ADVIL/MOTRIN) tablet 600 mg     loperamide (IMODIUM) capsule 2 mg     magnesium hydroxide (MILK OF MAGNESIA) suspension 30 mL     nicotine polacrilex (NICORETTE) gum 4-8 mg     traZODone (DESYREL) tablet 50 mg            Allergies:   No Known Allergies          Physical Exam:   /66   Pulse 65   Temp 98.6  F (37  C) (Oral)   Resp 16   Ht 1.88 m (6' 2\")   Wt 91.2 kg (201 lb)   SpO2 96%   BMI 25.81 kg/m     GENERAL: Alert " and oriented x 3. NAD.   HEENT: Anicteric sclera. Mucous membranes moist.   CV: RRR. S1, S2. No murmurs appreciated.   RESPIRATORY: Effort normal on room air. Lungs CTAB with no wheezing, rales, rhonchi.   GI: Abdomen soft and non distended with normoactive bowel sounds present in all quadrants. No tenderness, rebound, guarding.   MUSCULOSKELETAL: No joint swelling or tenderness. Moves all extremities.   NEUROLOGICAL: No focal deficits. Strength 5/5 bilaterally in upper and lower extremities.   EXTREMITIES: No peripheral edema. Intact bilateral pedal pulses.   SKIN: No jaundice. No rashes.          Labs:   CBC:  Recent Labs   Lab Test 06/26/19  1737   WBC 9.2   RBC 5.13   HGB 15.0   HCT 44.4   MCV 87   MCH 29.2   MCHC 33.8   RDW 11.5          CMP:  Recent Labs   Lab Test 06/26/19  1737      POTASSIUM 4.3   CHLORIDE 100   ASCENCION 9.1   CO2 27   BUN 16   CR 0.75   GLC 65*   AST 24   ALT 23   BILITOTAL 0.4   ALBUMIN 4.0   PROTTOTAL 7.5   ALKPHOS 64       INR:   No results for input(s): INR in the last 15927 hours.          Hoa Polanco PA-C  Internal Medicine [facility] Hospitalist   [facility]   Pager: [phone]

## 2019-06-27 NOTE — PLAN OF CARE
Behavioral Team Discussion: (6/27/2019)    Continued Stay Criteria/Rationale: Patient admitted for Chemical Use Issues.  Plan: The following services will be provided to the patient; psychiatric assessment, medication management, therapeutic milieu, individual and group support, and skills groups.   Participants: 3A Provider: Dr. Avis Lay MD; 3A RN's: Emeterio Bang, RN; 3A CM's: Colin Aguayo.  Summary/Recommendation: Providers will assess today for treatment recommendations, discharge planning, and aftercare plans. CM will meet with pt for discharge planning.   Medical/Physical:   ER Physical Exam   GEN:  Well developed, no acute distress  HEENT:  EOMI, Mucous membranes are moist.   Cardio:  RRR, no murmur, radial pulses equal bilaterally  PULM:  Lungs clear, good air movement, no wheezes, rales   Abd:  Soft, normal bowel sounds, no focal tenderness  Musculoskeletal:  normal range of motion, no lower extremity swelling or calf tenderness  Neuro:  Alert and oriented X3, Follows commands, moving all extremities spontaneously   Skin:  Warm, dry  Psych: Normal mood and affect, denies suicidal ideation, homicidal ideation, hallucinations  Precautions:   Behavioral Orders   Procedures     Code 1 - Restrict to Unit     Routine Programming     As clinically indicated     Status 15     Every 15 minutes.     Withdrawal precautions     Rationale for change in precautions or plan: N/A  Progress: Improving.

## 2019-06-27 NOTE — PLAN OF CARE
S:  Kris is a 30 yo male who comes to [facility] seeking detox from opiates.  He states that he has been injecting heroin IV since April 2019.  His last use was today (6/26/19 @ 10:30).  He states that he smokes some marijuana, < half a gm at a time, 3-5 X/week.  He uses tobacco 1/2 -1 tin/day.  He denies any other substance use.    He states that he is working full time for [employer].  He has a BA in Physiology. He lives with his girlfriend (GF), Oliva.  He has emotional support from his family, friends and his GF.  He denies now nor ever in the past having any thoughts of self harm, suicide, or thoughts of harming others.  B:  Aside from chemical dependency, he denies any other medical problems.  He has been to treatment twice at [facility] (2017 & 2019).  He is currently in OP treatment at [facility].  A:  Pt in very little opiate withdrawal AEB COWS score of 3.  R:  Obtained admission orders.  Oriented to unit, schedule, lab work and POC.  Counseled on nicotine cessation.  Provided with nutritious snacks and encouraged increased fluid intake.  Introduced to IM&R Treatment program and accompanying paperwork.  Administered Trazodone 50 mg and 2 piece of nicotine gum (4 mg each).  Continue to monitor and medicate as ordered and indicated.

## 2019-06-27 NOTE — H&P
Admitted:     06/26/2019      IDENTIFYING INFORMATION:  The patient is a 29-year-old  male.  He works in [employer].      CHIEF COMPLAINT:  Relapse.      HISTORY OF PRESENT ILLNESS:  The patient is in IOP treatment at [facility] but continues to use.  He is on Suboxone maintenance, but he relapsed.He states that he has been injecting heroin IV since April 2019.  His last use was today (6/26/19 @ 10:30).  He states that he smokes some marijuana, < half a gm at a time, 3-5 X/week.  He uses tobacco 1/2 -1 tin/day.  He denies any other substance use. He had overdoses   Started with pain pills at age 22.  A year ago, he switched to heroin.  He has tolerance, withdrawal, progressive use, loss of control, tried to quit unsuccessfully.  He has 2 overdoses.  Used despite having negative consequences.  His treatment has been impacted.  He denies using any other street drugs, does not scott.  Denies any suicidal ideation, plan or intent.      PAST PSYCHIATRIC HISTORY:  Never psychiatrically hospitalized.  He was in 3 chemical dependency treatments, both of them were in [facility].       PAST MEDICAL HISTORY:  Ten-point systems reviewed is negative.      VITAL SIGNS:  Temperature of 98.6, pulse of 65, respiratory rate 16, blood pressure 126/66.      FAMILY HISTORY:  Maternal grandfather was alcoholic.      SOCIAL HISTORY:  Born in [place], grew up in [place].  Good childhood, no abuse.        MENTAL STATUS EXAMINATION:  The patient is a 29-year-old male, who is lying in bed.  He has poor grooming, poor hygiene, poor eye contact.  Mood is tired.  Affect is congruent.  Speech is spontaneous, normal in rate.  Less logical in thinking.  No loose associations.  Judgment is limited.  Does not have any active suicidal ideation, plan or intent.             The patient is alert, oriented x3.  Good fund of knowledge.  Good use of language.  Recent and remote memory, language, fund of knowledge are all adequate. congruent affect   Speech normal rate/rhythm  no loose asso,The patient does not have any active suicidal or homicidal ideation.  Does not have any auditory or visual hallucination.  Poor  insight/judgment             DIAGNOSIS:   Axis I:  Opiate use disorder, severe.      PLAN:  The patient will be detoxed off opiates with buprenorphine.  The patient will be seen by Case Management and Internal Medicine.  The patient was asked to consider higher level of care.         DOLORES UGALDE MD             D: 2019   T: 2019   MT: YVONNE      Name:     LEOLA ARMANDO   MRN:      [id number]        Account:      [id number]   :      1990        Admitted:     2019                   Document: [id number]

## 2019-06-27 NOTE — PROGRESS NOTES
06/26/19 1956   Patient Belongings   Did you bring any home meds/supplements to the hospital?  No   Patient Belongings other (see comments)   Belongings Search Yes   Clothing Search Yes   Second Staff Austin     STORAGE BIN:   duffle bag, 5 shorts, 3 pants, toiletries, wallet, drawstring    MED ROOM BIN:   cell phone    SECURITY:   5 id, ebt, target, visa, carry prm, bus, ins, dnr, void ck, discover  A             Admission:  I am responsible for any personal items that are not sent to the safe or pharmacy.  [facility] is not responsible for loss, theft or damage of any property in my possession.    Signature:  _________________________________ Date: _______  Time: _____                                              Staff Signature:  ____________________________ Date: ________  Time: _____      2nd Staff person, if patient is unable/unwilling to sign:    Signature: ________________________________ Date: ________  Time: _____   Discharge:  [facility] has returned all of my personal belongings:    Signature: _________________________________ Date: ________  Time: _____                                          Staff Signature:  ____________________________ Date: ________  Time: _____

## 2019-06-27 NOTE — PROGRESS NOTES
Case Management Note  6/27/2019    Writer met with pt to initiate discharge planning. Pt reports he would like to return to OP treatment at [facility]. Pt reports he was in OP Treatment at [facility] and continued to use, failing his drug screens. He was instructed by his counselor (Kris Ralph) to check himself into detox. Pt signed a RYAN for [facility]. Writer faxed pt's RYAN to [facility] and left a voicemail message requesting a return phone call to speak with pt's counselor.    Colin Aguayo MA, [facility]

## 2019-06-28 VITALS
RESPIRATION RATE: 16 BRPM | HEIGHT: 74 IN | DIASTOLIC BLOOD PRESSURE: 71 MMHG | TEMPERATURE: 98.5 F | WEIGHT: 201 LBS | SYSTOLIC BLOOD PRESSURE: 124 MMHG | HEART RATE: 66 BPM | OXYGEN SATURATION: 98 % | BODY MASS INDEX: 25.8 KG/M2

## 2019-06-28 PROCEDURE — 99239 HOSP IP/OBS DSCHRG MGMT >30: CPT | Performed by: PSYCHIATRY & NEUROLOGY

## 2019-06-28 PROCEDURE — 25000132 ZZH RX MED GY IP 250 OP 250 PS 637: Performed by: PSYCHIATRY & NEUROLOGY

## 2019-06-28 RX ORDER — BUPRENORPHINE 2 MG/1
6 TABLET SUBLINGUAL ONCE
Status: COMPLETED | OUTPATIENT
Start: 2019-06-28 | End: 2019-06-28

## 2019-06-28 RX ADMIN — BUPRENORPHINE HYDROCHLORIDE 2 MG: 2 TABLET SUBLINGUAL at 09:10

## 2019-06-28 RX ADMIN — BUPRENORPHINE HCL 6 MG: 2 TABLET SUBLINGUAL at 12:30

## 2019-06-28 RX ADMIN — NICOTINE POLACRILEX 8 MG: 4 GUM, CHEWING ORAL at 09:09

## 2019-06-28 RX ADMIN — NICOTINE POLACRILEX 8 MG: 4 GUM, CHEWING ORAL at 10:49

## 2019-06-28 NOTE — DISCHARGE SUMMARY
Admit Date:     06/26/2019   Discharge Date:           More than 35 minutes were spent on this summary, doing the discharge instructions, discharge medications, discharge mental status examination.      DISCHARGE DIAGNOSIS:   Axis I:  Opiate use disorder, severe.      Please review admission by Dr. Lay on 06/27/2019.        HOSPITAL COURSE:  During the hospitalization, the patient had an uneventful upward titration of Suboxone.  He was seen by Hoa Bonilla for Internal Medicine consult.  The patient had gone back to Suboxone.  He says it is very hard for him because he does not like the taste of it.  He met with the  and his plan is to go back to [facility].  He was in outpatient treatment and continued to fail, using.  He was instructed by his counselor to get back into detox.  During the hospitalization, the patient did not have any suicidal ideation, plan or intent.  He met with the  and his plan is to do treatment.      DISCHARGE DISPOSITION:  The patient is going home.  He is going to follow up with Rebecca.  He is going to have a Suboxone appointment with Dr. Bajwa next Friday.      DISCHARGE MENTAL STATUS EXAMINATION:  The patient is alert, oriented x3.  Recent and remote memory, language, fund of knowledge adequate.  No loose associations.  The patient does not have any active suicidal ideation, plan or intent.        The patient is stable to be discharged.       Disposition: Pt discharged to return to [facility].     Psychiatry/medical follow up:   You report that you will follow up with your provider through your [facility].   Appointment Date/Time: 7/1/19 @ 9:30   Other: Telephone intake/assessment appointment with Cass   Address: [facility]   Phone Number: [phone]    I  There are no discharge medications for this patient.       DOLORES LAY MD             D: 06/28/2019   T: 06/28/2019   MT: WILLOW      Name:     LEOLA ARMANDO    MRN:      [id number]        Account:        [id number]   :      1990           Admit Date:     2019                                  Discharge Date:       Document: [id number]

## 2019-06-28 NOTE — DISCHARGE INSTRUCTIONS
Behavioral Discharge Planning and Instructions  THANK YOU FOR CHOOSING THE [facility]  3A  [phone]    Summary: You were admitted to Station 3A on 6/26/19 for detoxification from heroin.  A medical exam was performed that included lab work. You have met with a  and opted to return to [facility] at [facility].  Please see the time and date of your telephone assessment with Cass at [facility]. Please take care and make your recovery a priority Kris! It was a pleasure working with you and the treatment team wishes you the very best in your recovery!  ~ 3A Treatment Team    Recommendation:  Outpatient Treatment, psychotherapy, sober support group engagement and active work with a sponsor or  through [facility] Connection.    Main Diagnosis: Per Dr. Avis Lay MD;  304.00 (F11.20) Opioid Use Disorder Severe    Major Treatments, Procedures and Findings:  You have withdrawn from opiates using subutex.  You will use your home supply at discharge. You have had labs drawn and those results have been reviewed with you. Please take a copy of your lab work with you to your next primary care physician appointment.    Symptoms to Report:  If you experience more anxiety, confusion, sleeplessness, deep sadness or thoughts of suicide, notify your treatment team or notify your primary care physician. IF ANY OF THE SYMPTOMS YOU ARE EXPERIENCING ARE A MEDICAL EMERGENCY CALL 911 IMMEDIATELY.     Lifestyle Adjustment: Adjust your lifestyle to get enough sleep, relaxation, exercise and  good nutrition. Continue to develop healthy coping skills to decrease stress and promote a sober living environment. Do not use alcohol, illegal drugs or addictive medications other than what is currently prescribed. AA, NA, and  Sponsor are excellent resources for support. There has been an increase in opioid overdoses and deaths recently. Even after a short period of no drug use a persons  tolerance level is lowered. It is not safe to think a person can use the same amount of drugs as they did prior to their period of no drug use. Returning to your drug using environment and contact with your drug using friends puts you at high risk for relapse as well as death associated with an overdose. Www.harmreduction.org    Disposition: Pt discharged to return to [facility].    Psychiatry/medical follow up:   You report that you will follow up with your provider through your [facility].   Appointment Date/Time: 7/1/19 @ 9:30   Other: Telephone intake/assessment appointment with Cass   Address: [place]   Phone Number: [phone]    I    DISCHARGE RESOURCES:  -SMART Recovery - self management for addiction recovery:  www.smartrecovery.org    -Pathways ~ A Health Crisis Resource & Support Center: 409.629.3033.  -[facility] 335-718-3636   -[facility] Behavioral Intake 161-639-9080 or 855-794-4046.  -Suicide Awareness Voices of Education (SAVE) (www.save.org): 862-799-JJVY (7283)  -National Suicide Prevention Line (www.mentalhealthmn.org): 366-876-ANFP (0835)  -National Schuyler on Mental Illness (www.mn.haja.org): 255.267.4326 or 131-621-7151.  -Qljz2qnme: text the word LIFE to 69669 for immediate support and crisis intervention  -Mental Health Consumer/Survivor Network of [place] (www.mhcsn.net): 437.970.8981 or 314-784-7297  -Mental Health Association of [place] (www.mentalhealth.org): 310.281.2630 or 087-690-7526     -Substance Abuse and Mental Health Services (www.samhsa.gov)  -Harm Reduction Coalition (www. Harmreduction.org)  -www.prescribetoprevent.org or http://prescribetoprevent.org/video  -Poison control 2-805-701-7652   **[place] Opioid Prevention Coalition: www.opioidcoalition.org    Sober Support Group Information:  AA/NA & Sponsor/Support  -Alcoholics Anonymous (www.alcoholics-anonymous.org): for local information 24  hours/day  -AA Intergroup service office in [place] (http://www.aastpaul.org/) 184.552.8232  -AA Intergroup service office in [facility]: 589.936.3227. (http://www.a[place].org/)  -Narcotics Anonymous (www.nam[place].org) (977) 450-5869   **Sober Fun Activities: www.soberInsightra Medicalactivities.Cybits/[facility]//[facility] [place] [facility] Recovery Connection ([facility])  [facility] connects people seeking recovery to resources that help foster and sustain long-term recovery.  Whether you are seeking resources for treatment, transportation, housing, job training, education, health care or other pathways to recovery, [facility] is a great place to start.    Phone: 408.486.5264.  www.[place]Fengguo (Great listing of all types of recovery and non-recovery related resources)    General Medication Instructions:   See your medication sheet(s) for instructions.   Take all medicines as directed.  Make no changes unless your doctor suggests them.   Go to all your doctor visits.  Be sure to have all your required lab tests. This way, your medicines can be refilled on time.  Do not use any drugs not prescribed by your provider.  AA/NA and Sponsors are excellent resources for support  Avoid alcohol.    Any follow up concerns:  Nursing questions call the Unit -[facility] [phone]  Medical Record call [phone]  Outpatient Behavioral Intake call [phone]  LP+ Wait List/Bed Availability call [phone] (Rei)    The entire treatment team has appreciated the opportunity to work with you Kris.  We wish you the best in the future and with your lifelong recovery goals. Please bring this discharge folder with you to all follow up appointments.  It contains your lab results, diagnosis, medication list and discharge recommendations.    THANK YOU FOR CHOOSING THE [facility]

## 2019-06-28 NOTE — PROGRESS NOTES
Case Management Note  6/28/2019    Writer spoke with Rocío at [facility] [phone]. She requests writer contact her with pt's anticipated discharge date. She reports once his discharge date has been determined, they can schedule pt for a intake/admisssion appointment to return to [facility]'s IOP Treatment Program.    Pt is scheduled to discharge today, Friday, 6/28/19. He has a telephone assessment/intake appointment scheduled with Cass at [facility] on Monday, 7/1/19 at 9:30 am. Case management complete.    Colin Aguayo MA, [facility]

## 2019-06-28 NOTE — PROGRESS NOTES
Patient discharged to home.  Reports being picked up by girlfriend. Patient escorted off the unit by psych associate, Asad.       All patient belongings from room, locked bin and security were sent with patient. He reports that he has a home supply of suboxone and no medications were ordered at discharge. This RN went over discharge instructions, teachings, patient's labs,medications and discharge recommendations.  Patient verbalizes and demonstrates understanding of all teachings. Patient denies thoughts of harm towards self or others. Pt denies any current medical issues at this time. Patient denies any further questions and has be discharge at this time.